# Patient Record
Sex: FEMALE | Race: WHITE | NOT HISPANIC OR LATINO
[De-identification: names, ages, dates, MRNs, and addresses within clinical notes are randomized per-mention and may not be internally consistent; named-entity substitution may affect disease eponyms.]

---

## 2018-08-19 ENCOUNTER — TRANSCRIPTION ENCOUNTER (OUTPATIENT)
Age: 33
End: 2018-08-19

## 2019-03-02 ENCOUNTER — TRANSCRIPTION ENCOUNTER (OUTPATIENT)
Age: 34
End: 2019-03-02

## 2021-01-17 ENCOUNTER — TRANSCRIPTION ENCOUNTER (OUTPATIENT)
Age: 36
End: 2021-01-17

## 2021-02-03 ENCOUNTER — TRANSCRIPTION ENCOUNTER (OUTPATIENT)
Age: 36
End: 2021-02-03

## 2021-09-16 ENCOUNTER — TRANSCRIPTION ENCOUNTER (OUTPATIENT)
Age: 36
End: 2021-09-16

## 2022-09-06 ENCOUNTER — EMERGENCY (EMERGENCY)
Facility: HOSPITAL | Age: 37
LOS: 1 days | Discharge: HOME | End: 2022-09-06
Attending: STUDENT IN AN ORGANIZED HEALTH CARE EDUCATION/TRAINING PROGRAM
Payer: COMMERCIAL

## 2022-09-06 VITALS
SYSTOLIC BLOOD PRESSURE: 137 MMHG | HEART RATE: 120 BPM | TEMPERATURE: 99 F | RESPIRATION RATE: 18 BRPM | OXYGEN SATURATION: 99 % | DIASTOLIC BLOOD PRESSURE: 88 MMHG | HEIGHT: 64 IN

## 2022-09-06 PROCEDURE — 73562 X-RAY EXAM OF KNEE 3: CPT

## 2022-09-06 PROCEDURE — 73030 X-RAY EXAM OF SHOULDER: CPT | Mod: 26,LT

## 2022-09-06 PROCEDURE — 73562 X-RAY EXAM OF KNEE 3: CPT | Mod: 26,LT

## 2022-09-06 PROCEDURE — 99284 EMERGENCY DEPT VISIT MOD MDM: CPT | Mod: 25

## 2022-09-06 PROCEDURE — 72040 X-RAY EXAM NECK SPINE 2-3 VW: CPT

## 2022-09-06 PROCEDURE — 99284 EMERGENCY DEPT VISIT MOD MDM: CPT

## 2022-09-06 PROCEDURE — 71046 X-RAY EXAM CHEST 2 VIEWS: CPT | Mod: 26

## 2022-09-06 PROCEDURE — 93005 ELECTROCARDIOGRAM TRACING: CPT

## 2022-09-06 PROCEDURE — 93010 ELECTROCARDIOGRAM REPORT: CPT

## 2022-09-06 PROCEDURE — 71046 X-RAY EXAM CHEST 2 VIEWS: CPT

## 2022-09-06 PROCEDURE — 72040 X-RAY EXAM NECK SPINE 2-3 VW: CPT | Mod: 26

## 2022-09-06 PROCEDURE — 73030 X-RAY EXAM OF SHOULDER: CPT

## 2022-09-06 RX ORDER — CYCLOBENZAPRINE HYDROCHLORIDE 10 MG/1
1 TABLET, FILM COATED ORAL
Qty: 21 | Refills: 0
Start: 2022-09-06 | End: 2022-09-12

## 2022-09-06 RX ORDER — IBUPROFEN 200 MG
600 TABLET ORAL ONCE
Refills: 0 | Status: COMPLETED | OUTPATIENT
Start: 2022-09-06 | End: 2022-09-06

## 2022-09-06 RX ORDER — OXYCODONE HYDROCHLORIDE 5 MG/1
1 TABLET ORAL
Qty: 12 | Refills: 0
Start: 2022-09-06 | End: 2022-09-08

## 2022-09-06 RX ORDER — CYCLOBENZAPRINE HYDROCHLORIDE 10 MG/1
5 TABLET, FILM COATED ORAL ONCE
Refills: 0 | Status: COMPLETED | OUTPATIENT
Start: 2022-09-06 | End: 2022-09-06

## 2022-09-06 RX ORDER — ACETAMINOPHEN 500 MG
975 TABLET ORAL ONCE
Refills: 0 | Status: COMPLETED | OUTPATIENT
Start: 2022-09-06 | End: 2022-09-06

## 2022-09-06 RX ADMIN — Medication 600 MILLIGRAM(S): at 21:46

## 2022-09-06 RX ADMIN — CYCLOBENZAPRINE HYDROCHLORIDE 5 MILLIGRAM(S): 10 TABLET, FILM COATED ORAL at 21:46

## 2022-09-06 RX ADMIN — Medication 975 MILLIGRAM(S): at 21:45
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
## 2022-09-06 NOTE — ED PROVIDER NOTE - PATIENT PORTAL LINK FT
You can access the FollowMyHealth Patient Portal offered by [facility] by registering at the following website: [email or web address]. By joining [employer]’s FollowMyHealth portal, you will also be able to view your health information using other applications (apps) compatible with our system.
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
## 2024-06-05 ENCOUNTER — NON-APPOINTMENT (OUTPATIENT)
Age: 39
End: 2024-06-05